# Patient Record
Sex: FEMALE | Race: WHITE | NOT HISPANIC OR LATINO | Employment: STUDENT | URBAN - METROPOLITAN AREA
[De-identification: names, ages, dates, MRNs, and addresses within clinical notes are randomized per-mention and may not be internally consistent; named-entity substitution may affect disease eponyms.]

---
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
## 2021-08-11 ENCOUNTER — TELEPHONE (OUTPATIENT)
Dept: FAMILY MEDICINE CLINIC | Facility: CLINIC | Age: 21
End: 2021-08-11

## 2021-08-11 NOTE — TELEPHONE ENCOUNTER
Yes and would need a copy of her results uploaded into her chart   She should also have someone buy her a pulse ox to monitor her oxygen levels

## 2021-08-11 NOTE — TELEPHONE ENCOUNTER
Pt's mother called stating pt was tested for Covid for school clearance and it came back positive  Should I schedule her for a virtual follow up with you? Please advise  Thank you

## 2021-08-12 ENCOUNTER — TELEMEDICINE (OUTPATIENT)
Dept: FAMILY MEDICINE CLINIC | Facility: CLINIC | Age: 21
End: 2021-08-12
Payer: COMMERCIAL

## 2021-08-12 ENCOUNTER — TELEPHONE (OUTPATIENT)
Dept: FAMILY MEDICINE CLINIC | Facility: CLINIC | Age: 21
End: 2021-08-12

## 2021-08-12 VITALS
OXYGEN SATURATION: 98 % | HEART RATE: 73 BPM | TEMPERATURE: 99 F | SYSTOLIC BLOOD PRESSURE: 101 MMHG | DIASTOLIC BLOOD PRESSURE: 60 MMHG

## 2021-08-12 DIAGNOSIS — U07.1 COVID-19: Primary | ICD-10-CM

## 2021-08-12 PROCEDURE — 99213 OFFICE O/P EST LOW 20 MIN: CPT | Performed by: FAMILY MEDICINE

## 2021-08-12 PROCEDURE — 3725F SCREEN DEPRESSION PERFORMED: CPT | Performed by: FAMILY MEDICINE

## 2021-08-12 NOTE — PROGRESS NOTES
COVID-19 Outpatient Progress Note    Assessment/Plan:    Problem List Items Addressed This Visit        Other    COVID-19 - Primary     Asymptomatic   will quarantine              Disposition:     I recommended self-quarantine for 14 days and to call back for worsening symptoms or development of shortness of breath  I have spent 15 minutes directly with the patient  Verification of patient location:    Patient is located in the following state in which I hold an active license [place]    Encounter provider Tracy Elizondo DO    Provider located at [address] [id number]    Recent Visits  Date Type Provider Dept   08/11/21 Telephone Laura [facility]ney [facility]   Showing recent visits within past 7 days and meeting all other requirements  Today's Visits  Date Type Provider Dept   08/12/21 Telemedicine DO [facility]   Showing today's visits and meeting all other requirements  Future Appointments  No visits were found meeting these conditions  Showing future appointments within next 150 days and meeting all other requirements     This virtual check-in was done via [employer] and patient was informed that this is a secure, HIPAA-compliant platform  She agrees to proceed  Patient agrees to participate in a virtual check in via telephone or video visit instead of presenting to the office to address urgent/immediate medical needs  Patient is aware this is a billable service  After connecting through [facility], the patient was identified by name and date of birth  Ledy Downs was informed that this was a telemedicine visit and that the exam was being conducted confidentially over secure lines  My office door was closed  No one else was in the room  Ledy Downs acknowledged consent and understanding of privacy and security of the telemedicine visit   I informed the patient that I have reviewed her record in Epic and presented the opportunity for her to ask any questions regarding the visit today  The patient agreed to participate  Subjective:   Sadi Gabriel is a 24 y o  female who is concerned about COVID-19  Patient is currently asymptomatic  Patient denies fever, chills, fatigue, malaise, congestion, rhinorrhea, sore throat, anosmia, loss of taste, cough, shortness of breath, chest tightness, abdominal pain, nausea, vomiting, diarrhea, myalgias and headaches       Date of symptom onset: 8/9/2021    Exposure:   Contact with a person who is under investigation (PUI) for or who is positive for COVID-19 within the last 14 days?: No    Hospitalized recently for fever and/or lower respiratory symptoms?: No      Currently a healthcare worker that is involved in direct patient care?: No      Works in a special setting where the risk of COVID-19 transmission may be high? (this may include long-term care, correctional and longterm facilities; homeless shelters; assisted-living facilities and group homes ): No      Resident in a special setting where the risk of COVID-19 transmission may be high? (this may include long-term care, correctional and longterm facilities; homeless shelters; assisted-living facilities and group homes ): No      Took a test at [facility] to go back to school  Test was positive  No symptoms    Lab Results   Component Value Date    SARSCOV2 DETECTED (A) 08/09/2021     Past Medical History:   Diagnosis Date    Allergic     Asthma      Past Surgical History:   Procedure Laterality Date    NO PAST SURGERIES       Current Outpatient Medications   Medication Sig Dispense Refill    albuterol (VENTOLIN HFA) 90 mcg/act inhaler Inhale 2 puffs 4 (four) times a day as needed for wheezing 1 Inhaler 3    Albuterol Sulfate (PROAIR RESPICLICK) 868 (90 Base) MCG/ACT AEPB Inhale 2 puffs 4 (four) times a day as needed (wheeze) 2 each 2    clindamycin (CLINDAGEL) 1 % gel Apply topically daily       No current facility-administered medications for this visit  No Known Allergies    Review of Systems   Constitutional: Negative for chills, fatigue and fever  HENT: Negative for congestion, rhinorrhea and sore throat  Respiratory: Negative for cough, chest tightness and shortness of breath  Gastrointestinal: Negative for abdominal pain, diarrhea, nausea and vomiting  Musculoskeletal: Negative for myalgias  Neurological: Negative for headaches  Objective:    Vitals:    08/12/21 1338   BP: 101/60   Pulse: 73   Temp: 99 °F (37 2 °C)   SpO2: 98%       Physical Exam    VIRTUAL VISIT DISCLAIMER    Rylan Vinson verbally agrees to participate in [employer]  Pt is aware that [employer] could be limited without vital signs or the ability to perform a full hands-on physical exam  Namita Quiroz understands she or the provider may request at any time to terminate the video visit and request the patient to seek care or treatment in person  none